# Patient Record
Sex: MALE | Race: WHITE | URBAN - METROPOLITAN AREA
[De-identification: names, ages, dates, MRNs, and addresses within clinical notes are randomized per-mention and may not be internally consistent; named-entity substitution may affect disease eponyms.]

---

## 2024-02-06 ENCOUNTER — OFFICE VISIT (OUTPATIENT)
Age: 69
End: 2024-02-06
Payer: COMMERCIAL

## 2024-02-06 VITALS
HEIGHT: 65 IN | SYSTOLIC BLOOD PRESSURE: 130 MMHG | BODY MASS INDEX: 30.22 KG/M2 | OXYGEN SATURATION: 97 % | DIASTOLIC BLOOD PRESSURE: 72 MMHG | WEIGHT: 181.4 LBS | HEART RATE: 73 BPM | TEMPERATURE: 97.3 F | RESPIRATION RATE: 16 BRPM

## 2024-02-06 DIAGNOSIS — R74.8 ELEVATED LIVER ENZYMES: Primary | ICD-10-CM

## 2024-02-06 DIAGNOSIS — Z86.010 HISTORY OF COLON POLYPS: ICD-10-CM

## 2024-02-06 DIAGNOSIS — E78.2 MIXED HYPERLIPIDEMIA: ICD-10-CM

## 2024-02-06 DIAGNOSIS — D64.9 ANEMIA, UNSPECIFIED TYPE: ICD-10-CM

## 2024-02-06 DIAGNOSIS — I49.9 IRREGULAR HEART BEAT: Primary | ICD-10-CM

## 2024-02-06 DIAGNOSIS — F31.31 BIPOLAR 1 DISORDER, DEPRESSED, MILD (HCC): ICD-10-CM

## 2024-02-06 DIAGNOSIS — I77.89 ASCENDING AORTA ENLARGEMENT (HCC): ICD-10-CM

## 2024-02-06 DIAGNOSIS — I48.19 PERSISTENT ATRIAL FIBRILLATION (HCC): ICD-10-CM

## 2024-02-06 DIAGNOSIS — K76.0 FATTY INFILTRATION OF LIVER: ICD-10-CM

## 2024-02-06 DIAGNOSIS — I77.89 ENLARGED THORACIC AORTA (HCC): ICD-10-CM

## 2024-02-06 PROBLEM — Z86.0100 HISTORY OF COLON POLYPS: Status: ACTIVE | Noted: 2024-02-06

## 2024-02-06 PROCEDURE — 93000 ELECTROCARDIOGRAM COMPLETE: CPT | Performed by: FAMILY MEDICINE

## 2024-02-06 PROCEDURE — 99214 OFFICE O/P EST MOD 30 MIN: CPT | Performed by: FAMILY MEDICINE

## 2024-02-06 PROCEDURE — 1123F ACP DISCUSS/DSCN MKR DOCD: CPT | Performed by: FAMILY MEDICINE

## 2024-02-06 SDOH — ECONOMIC STABILITY: HOUSING INSECURITY
IN THE LAST 12 MONTHS, WAS THERE A TIME WHEN YOU DID NOT HAVE A STEADY PLACE TO SLEEP OR SLEPT IN A SHELTER (INCLUDING NOW)?: NO

## 2024-02-06 SDOH — ECONOMIC STABILITY: FOOD INSECURITY: WITHIN THE PAST 12 MONTHS, THE FOOD YOU BOUGHT JUST DIDN'T LAST AND YOU DIDN'T HAVE MONEY TO GET MORE.: NEVER TRUE

## 2024-02-06 SDOH — ECONOMIC STABILITY: INCOME INSECURITY: HOW HARD IS IT FOR YOU TO PAY FOR THE VERY BASICS LIKE FOOD, HOUSING, MEDICAL CARE, AND HEATING?: NOT HARD AT ALL

## 2024-02-06 SDOH — ECONOMIC STABILITY: FOOD INSECURITY: WITHIN THE PAST 12 MONTHS, YOU WORRIED THAT YOUR FOOD WOULD RUN OUT BEFORE YOU GOT MONEY TO BUY MORE.: NEVER TRUE

## 2024-02-06 ASSESSMENT — PATIENT HEALTH QUESTIONNAIRE - PHQ9
SUM OF ALL RESPONSES TO PHQ QUESTIONS 1-9: 0
SUM OF ALL RESPONSES TO PHQ9 QUESTIONS 1 & 2: 0
1. LITTLE INTEREST OR PLEASURE IN DOING THINGS: 0
2. FEELING DOWN, DEPRESSED OR HOPELESS: 0
4. FEELING TIRED OR HAVING LITTLE ENERGY: 0
1. LITTLE INTEREST OR PLEASURE IN DOING THINGS: 0
SUM OF ALL RESPONSES TO PHQ9 QUESTIONS 1 & 2: 0
3. TROUBLE FALLING OR STAYING ASLEEP: 0
SUM OF ALL RESPONSES TO PHQ QUESTIONS 1-9: 0
2. FEELING DOWN, DEPRESSED OR HOPELESS: 0
7. TROUBLE CONCENTRATING ON THINGS, SUCH AS READING THE NEWSPAPER OR WATCHING TELEVISION: 0
8. MOVING OR SPEAKING SO SLOWLY THAT OTHER PEOPLE COULD HAVE NOTICED. OR THE OPPOSITE, BEING SO FIGETY OR RESTLESS THAT YOU HAVE BEEN MOVING AROUND A LOT MORE THAN USUAL: 0
10. IF YOU CHECKED OFF ANY PROBLEMS, HOW DIFFICULT HAVE THESE PROBLEMS MADE IT FOR YOU TO DO YOUR WORK, TAKE CARE OF THINGS AT HOME, OR GET ALONG WITH OTHER PEOPLE: 0
SUM OF ALL RESPONSES TO PHQ QUESTIONS 1-9: 0
9. THOUGHTS THAT YOU WOULD BE BETTER OFF DEAD, OR OF HURTING YOURSELF: 0
SUM OF ALL RESPONSES TO PHQ QUESTIONS 1-9: 0
5. POOR APPETITE OR OVEREATING: 0

## 2024-02-06 NOTE — PROGRESS NOTES
[facility]     Date of Visit:  2024  Patient Name: Thomas E Schoen   Patient :  1955     CHIEF COMPLAINT/HPI:     Thomas E Schoen is a 68 y.o. male who presents today for an general visit to be evaluated for the following condition(s):  Chief Complaint   Patient presents with    Annual Exam     Patient is here for yearly checkup, no labs      Patient is doing well no new healthcare issues.  He has no complaints.  He has not had any labs in a while that be rectified.  Approximately 3 years ago patient had CT of the chest for coronary artery calcification and was noted to have a 4 cm ascending aorta.  This will need to be repeated.  Patient is asymptomatic and feels great.  He works out every day  REVIEW OF SYSTEM      Review of Systems  Patient feels great has no symptoms just returned from a trip to [place].  No fever no sweats no chills no chest pain no shortness of breath no palpitations no edema no nausea no vomiting no diarrhea    REVIEWED INFORMATION      No Known Allergies    Current Outpatient Medications   Medication Sig Dispense Refill    apixaban (ELIQUIS) 5 MG TABS tablet Take 1 tablet by mouth 2 times daily 60 tablet 11     No current facility-administered medications for this visit.        Patient Active Problem List   Diagnosis    Mixed hyperlipidemia    Elevated liver enzymes    History of colon polyps    Fatty infiltration of liver    Bipolar 1 disorder, depressed, mild (HCC)    Enlarged thoracic aorta (HCC)    Anemia    Ascending aorta enlargement (HCC)    Persistent atrial fibrillation (HCC)    Irregular heart beat       Past Medical History:   Diagnosis Date    Bipolar disorder (HCC)     Colon polyps     Fatty liver     Hyperlipidemia        Past Surgical History:   Procedure Laterality Date    TONSILLECTOMY          Social History     Socioeconomic History    Marital status:      Spouse name: None    Number of children: None    Years of education: None    Highest

## 2024-02-06 NOTE — PROGRESS NOTES
EKG was done on patient for irregular heart rhythm, approved by Dr. Diego Morin. ABN was signed and copy was given to pt

## 2024-02-08 ENCOUNTER — HOSPITAL ENCOUNTER (OUTPATIENT)
Age: 69
Setting detail: SPECIMEN
Discharge: HOME | End: 2024-02-08

## 2024-02-08 LAB
ALBUMIN SERPL-MCNC: 4.2 G/DL (ref 3.5–5.2)
ALBUMIN/GLOB SERPL: 1.7 {RATIO} (ref 1–2.5)
ALP SERPL-CCNC: 46 U/L (ref 40–129)
ALT SERPL-CCNC: 19 U/L (ref 5–41)
ANION GAP SERPL CALCULATED.3IONS-SCNC: 10 MMOL/L (ref 9–17)
AST SERPL-CCNC: 22 U/L
BILIRUB SERPL-MCNC: 0.6 MG/DL (ref 0.3–1.2)
BUN SERPL-MCNC: 16 MG/DL (ref 8–23)
CALCIUM SERPL-MCNC: 9.2 MG/DL (ref 8.6–10.4)
CHLORIDE SERPL-SCNC: 102 MMOL/L (ref 98–107)
CHOLEST SERPL-MCNC: 181 MG/DL
CHOLESTEROL/HDL RATIO: 2.4
CO2 SERPL-SCNC: 23 MMOL/L (ref 20–31)
CREAT SERPL-MCNC: 0.8 MG/DL (ref 0.7–1.2)
ERYTHROCYTE [DISTWIDTH] IN BLOOD BY AUTOMATED COUNT: 14.4 % (ref 11.8–14.4)
GFR SERPL CREATININE-BSD FRML MDRD: >60 ML/MIN/1.73M2
GLUCOSE SERPL-MCNC: 72 MG/DL (ref 70–99)
HCT VFR BLD AUTO: 43.1 % (ref 40.7–50.3)
HDLC SERPL-MCNC: 76 MG/DL
HGB BLD-MCNC: 13.9 G/DL (ref 13–17)
LDLC SERPL CALC-MCNC: 90 MG/DL (ref 0–130)
MCH RBC QN AUTO: 32 PG (ref 25.2–33.5)
MCHC RBC AUTO-ENTMCNC: 32.3 G/DL (ref 28.4–34.8)
MCV RBC AUTO: 99.3 FL (ref 82.6–102.9)
NRBC BLD-RTO: 0 PER 100 WBC
PLATELET # BLD AUTO: 265 K/UL (ref 138–453)
PMV BLD AUTO: 11.5 FL (ref 8.1–13.5)
POTASSIUM SERPL-SCNC: 4 MMOL/L (ref 3.7–5.3)
PROT SERPL-MCNC: 6.7 G/DL (ref 6.4–8.3)
RBC # BLD AUTO: 4.34 M/UL (ref 4.21–5.77)
SODIUM SERPL-SCNC: 135 MMOL/L (ref 135–144)
T4 FREE SERPL-MCNC: 1.2 NG/DL (ref 0.9–1.7)
TRIGL SERPL-MCNC: 76 MG/DL
TSH SERPL DL<=0.05 MIU/L-ACNC: 4.03 UIU/ML (ref 0.3–5)
WBC OTHER # BLD: 6.6 K/UL (ref 3.5–11.3)

## 2024-02-09 ENCOUNTER — TELEPHONE (OUTPATIENT)
Age: 69
End: 2024-02-09

## 2024-02-09 NOTE — TELEPHONE ENCOUNTER
Nattokinase is not effective in atrial fib-- Eliquis is the go to drug.  Okay to take 5 htp --this is an amino acid it is safe and has been shown to improve mood and help depression

## 2024-02-09 NOTE — TELEPHONE ENCOUNTER
Patient is calling asking if he can take Nattokinase instead of Eliquis. Also he is asking  for 5-HTP for depression

## 2024-02-16 ENCOUNTER — HOSPITAL ENCOUNTER (OUTPATIENT)
Dept: CT IMAGING | Age: 69
End: 2024-02-16
Attending: FAMILY MEDICINE
Payer: COMMERCIAL

## 2024-02-16 DIAGNOSIS — I77.89 ASCENDING AORTA ENLARGEMENT (HCC): ICD-10-CM

## 2024-02-16 PROCEDURE — 71250 CT THORAX DX C-: CPT

## 2024-02-17 ENCOUNTER — TELEPHONE (OUTPATIENT)
Age: 69
End: 2024-02-17

## 2024-02-17 NOTE — TELEPHONE ENCOUNTER
Patient's prior authorization has been approved for CT of the chest.  Please call patient-- is it scheduled?

## 2024-02-21 NOTE — RESULT ENCOUNTER NOTE
Please call patient shows 4.4 cm enlargement of the thoracic aorta.  Somewhere along the line I would like him to see thoracic vascular surgeon just for an opinion usually surgery is not done for this size aorta.  Some coronary artery calcium deposits were noted -we will get the opinion of cardiologist that he is seeing for atrial fib.  5 mm size lung nodule is not suspicious referral in chart to see cardiovascular surgeon

## 2024-02-23 ENCOUNTER — TELEPHONE (OUTPATIENT)
Age: 69
End: 2024-02-23

## 2024-02-23 NOTE — TELEPHONE ENCOUNTER
Patient had colonoscopy done March 2021 and he does not need another colon screening until March 2031.  Will his insurance company except that

## 2024-03-22 LAB — NONINV COLON CA DNA+OCC BLD SCRN STL QL: NEGATIVE

## 2024-05-01 ENCOUNTER — OFFICE VISIT (OUTPATIENT)
Age: 69
End: 2024-05-01
Payer: COMMERCIAL

## 2024-05-01 ENCOUNTER — TELEPHONE (OUTPATIENT)
Age: 69
End: 2024-05-01

## 2024-05-01 VITALS
WEIGHT: 193.4 LBS | SYSTOLIC BLOOD PRESSURE: 124 MMHG | BODY MASS INDEX: 32.22 KG/M2 | HEART RATE: 64 BPM | OXYGEN SATURATION: 96 % | DIASTOLIC BLOOD PRESSURE: 82 MMHG | HEIGHT: 65 IN

## 2024-05-01 DIAGNOSIS — G89.29 CHRONIC RIGHT SHOULDER PAIN: ICD-10-CM

## 2024-05-01 DIAGNOSIS — I48.19 PERSISTENT ATRIAL FIBRILLATION (HCC): ICD-10-CM

## 2024-05-01 DIAGNOSIS — E78.2 MIXED HYPERLIPIDEMIA: ICD-10-CM

## 2024-05-01 DIAGNOSIS — M25.511 CHRONIC RIGHT SHOULDER PAIN: ICD-10-CM

## 2024-05-01 DIAGNOSIS — I77.89 ASCENDING AORTA ENLARGEMENT (HCC): Primary | ICD-10-CM

## 2024-05-01 PROCEDURE — 1123F ACP DISCUSS/DSCN MKR DOCD: CPT | Performed by: FAMILY MEDICINE

## 2024-05-01 PROCEDURE — 99213 OFFICE O/P EST LOW 20 MIN: CPT | Performed by: FAMILY MEDICINE

## 2024-05-01 RX ORDER — ATORVASTATIN CALCIUM 10 MG/1
10 TABLET, FILM COATED ORAL DAILY
Qty: 30 TABLET | Refills: 5 | Status: SHIPPED | OUTPATIENT
Start: 2024-05-01

## 2024-05-01 NOTE — PROGRESS NOTES
Socioeconomic History    Marital status:      Spouse name: None    Number of children: None    Years of education: None    Highest education level: None   Tobacco Use    Smoking status: Never    Smokeless tobacco: Never   Vaping Use    Vaping Use: Never used   Substance and Sexual Activity    Alcohol use: Yes     Comment: socially     Social Determinants of Health     Financial Resource Strain: Low Risk  (2/6/2024)    Overall Financial Resource Strain (CARDIA)     Difficulty of Paying Living Expenses: Not hard at all   Food Insecurity: No Food Insecurity (2/6/2024)    Hunger Vital Sign     Worried About Running Out of Food in the Last Year: Never true     Ran Out of Food in the Last Year: Never true   Transportation Needs: Unknown (2/6/2024)    PRAPARE - Transportation     Lack of Transportation (Non-Medical): No   Housing Stability: Unknown (2/6/2024)    Housing Stability Vital Sign     Unstable Housing in the Last Year: No        PHYSICAL EXAM     /82   Pulse 64   Ht 1.651 m (5' 5\")   Wt 87.7 kg (193 lb 6.4 oz)   SpO2 96%   BMI 32.18 kg/m²    Physical Exam    Pulse and blood pressure 1 well-controlled.  Patient has no complaints except for stiffness of the right shoulder which she dislocated years ago.  Neck no masses chest clear heart irregular rhythm of atrial fibs with well-controlled rate.  No murmur was detected.  Abdomen soft flat nontender no pulsatile lesions.  No peripheral edema on today's exam.  ASSESSMENT/PLAN     1. Ascending aorta enlargement (HCC)  2. Persistent atrial fibrillation (HCC)  3. Mixed hyperlipidemia     Patient will be referred to cardiology also to orthopedic surgeons.  Return here in 4 months.  Also will get ultrasound of the abdominal aorta.  Add statin    COMMUNICATION:       Electronically signed by Diego Morin MD on 5/1/2024 at 10:28 AM    Portion of this note were dictated using Dragon speech recognition software. It has been reviewed for accuracy, but may

## 2024-05-01 NOTE — TELEPHONE ENCOUNTER
Please call patient I called in a prescription for statin drug which helps prevent hardening of the arteries I forgot to tell him this at last visit prescription sent into his pharmacy

## 2024-05-01 NOTE — TELEPHONE ENCOUNTER
Patient states that he thinks that he has a nature medication that will help this and he will call back with the name of this medication.

## 2024-05-14 ENCOUNTER — OFFICE VISIT (OUTPATIENT)
Age: 69
End: 2024-05-14
Payer: COMMERCIAL

## 2024-05-14 VITALS — HEIGHT: 65 IN | BODY MASS INDEX: 32.15 KG/M2 | WEIGHT: 193 LBS

## 2024-05-14 DIAGNOSIS — M19.011 PRIMARY OSTEOARTHRITIS OF RIGHT SHOULDER: ICD-10-CM

## 2024-05-14 DIAGNOSIS — M25.511 RIGHT SHOULDER PAIN, UNSPECIFIED CHRONICITY: Primary | ICD-10-CM

## 2024-05-14 DIAGNOSIS — M67.929 BICEPS TENDINOPATHY, UNSPECIFIED LATERALITY: ICD-10-CM

## 2024-05-14 PROCEDURE — 1123F ACP DISCUSS/DSCN MKR DOCD: CPT | Performed by: ORTHOPAEDIC SURGERY

## 2024-05-15 NOTE — PROGRESS NOTES
[facility]      [facility]YS[facility], [facility]  [facility]  [address]  [place] [address]  Dept: [phone]  Dept Fax: [phone]    Chief Compliant:  Chief Complaint   Patient presents with    Shoulder Pain     Right shoulder        History of Present Illness:  This is a pleasant 68 y.o. male who presents with right shoulder pain that is chronic getting progressively worse over many months and worse with overhead activity and lifting.  He does continue to be quite active and is learned to manage his pain.    Previous Treatment:    NSAIDs: Ibuprofen  Physical Therapy: No  Injections: No  Surgery: No    Physical Exam: The right shoulder has 150 degrees of active forward elevation with pain and crepitus.  He has 5/5 supraspinatus and infraspinatus strength.  He is a good lift off and belly press test.  He can internally rotate to his lumbar spine.  He has tenderness to the biceps tendon.    Imaging: X-rays of the right shoulder 3 views taken today show end-stage bone-on-bone glenohumeral joint space narrowing with subchondral cyst subchondral sclerosis and osteophyte formation.  There is no high riding humerus.      Assessment and Plan:    This is a pleasant 68 y.o. male who has right shoulder glenohumeral osteoarthritis and biceps tendinopathy..  His rotator cuff is functioning well.  We discussed options.  Explained that he needs a shoulder replacement surgery.  He would like to delay this.  We elected to proceed with cortisone injection.  Verbal consent was obtained. After the skin was cleansed with alcohol I injected 80 mg of Depo-Medrol and local anesthetic into the right shoulder glenohumeral joint.  Cortisone injection risks include infection, hyperglycemia, post injection pain. The patient tolerated the procedure well with no immediate adverse reactions.    In the future if he would like to proceed

## 2024-05-21 ENCOUNTER — TELEPHONE (OUTPATIENT)
Age: 69
End: 2024-05-21

## 2024-05-21 NOTE — TELEPHONE ENCOUNTER
Patient has a concern about taking a Statin, He is currently taking supplements, betasitosterol and aged garlic, and pycnogenol.  Not aware he needed to start on a statin, did not think his cholesterol numbers are bad, do you really think he needs to take a statin

## 2024-05-21 NOTE — TELEPHONE ENCOUNTER
Statin drugs help prevent hardening of the arteries by an anti-inflammatory effect on the wall of the artery.  This will help slow down enlargement of the aorta

## 2024-05-22 ENCOUNTER — TELEPHONE (OUTPATIENT)
Age: 69
End: 2024-05-22

## 2024-05-22 NOTE — TELEPHONE ENCOUNTER
Patient was given the message. He is having an US test done tomorrow and he sees dr. Iraheta on Friday.

## 2024-05-23 ENCOUNTER — HOSPITAL ENCOUNTER (OUTPATIENT)
Dept: VASCULAR LAB | Age: 69
Discharge: HOME | End: 2024-05-25
Payer: COMMERCIAL

## 2024-05-23 DIAGNOSIS — I77.89 ASCENDING AORTA ENLARGEMENT (HCC): ICD-10-CM

## 2024-05-23 PROCEDURE — 93976 VASCULAR STUDY: CPT

## 2024-05-24 ENCOUNTER — OFFICE VISIT (OUTPATIENT)
Age: 69
End: 2024-05-24
Payer: COMMERCIAL

## 2024-05-24 VITALS
OXYGEN SATURATION: 96 % | BODY MASS INDEX: 32.78 KG/M2 | DIASTOLIC BLOOD PRESSURE: 79 MMHG | HEART RATE: 96 BPM | WEIGHT: 197 LBS | SYSTOLIC BLOOD PRESSURE: 139 MMHG

## 2024-05-24 DIAGNOSIS — I48.19 PERSISTENT ATRIAL FIBRILLATION (HCC): Primary | ICD-10-CM

## 2024-05-24 LAB
VAS AORTA DIST AP: 2.31 CM
VAS AORTA DIST PSV: 87.5 CM/S
VAS AORTA DIST TR: 2.24 CM
VAS AORTA MID AP: 1.96 CM
VAS AORTA MID PSV: 107.3 CM/S
VAS AORTA MID TRANS: 1.76 CM
VAS AORTA PROX AP: 2.6 CM
VAS AORTA PROX PSV: 111.7 CM/S
VAS AORTA PROX TR: 1.85 CM
VAS LEFT COM ILIAC AP: 1.35 CM
VAS LEFT COM ILIAC TRANS: 1.67 CM
VAS RIGHT COM ILIAC AP: 1.55 CM
VAS RIGHT COM ILIAC TRANS: 1.57 CM

## 2024-05-24 PROCEDURE — 93000 ELECTROCARDIOGRAM COMPLETE: CPT | Performed by: INTERNAL MEDICINE

## 2024-05-24 PROCEDURE — 1123F ACP DISCUSS/DSCN MKR DOCD: CPT | Performed by: INTERNAL MEDICINE

## 2024-05-24 PROCEDURE — 99204 OFFICE O/P NEW MOD 45 MIN: CPT | Performed by: INTERNAL MEDICINE

## 2024-05-24 RX ORDER — METOPROLOL SUCCINATE 25 MG/1
25 TABLET, EXTENDED RELEASE ORAL DAILY
Qty: 30 TABLET | Refills: 3 | Status: SHIPPED | OUTPATIENT
Start: 2024-05-24

## 2024-05-24 NOTE — PROGRESS NOTES
Cardiology Office Consultation           CC: Patient is here to establish cardiac care for atrial fibrillation and Asc aortic aneurysm.     HPI  Thomas E Schoen is here to establish cardiac care.  He was recently found to have atrial fibrillation which is persistent.  His heart rate is well-controlled.  Also found to have ascending aortic aneurysm measuring 4.4 cm and was evaluated by cardiothoracic surgery at [facility].  Recommended repeat CTA in 1 year.  As far as symptoms are concerned he denies any recent chest pain or dyspnea.  His functional capacity is good.  He routinely exercises 3-4 times along with high intensity therapies without any decline in his capacity.      Past Medical:  Past Medical History:   Diagnosis Date    Bipolar disorder (HCC)     Colon polyps     Fatty liver     Hyperlipidemia        Past Surgical:  Past Surgical History:   Procedure Laterality Date    TONSILLECTOMY         Family History:  Family History   Problem Relation Age of Onset    Cancer Mother     Cancer Father        Social History:  Social History     Tobacco Use    Smoking status: Never    Smokeless tobacco: Never   Vaping Use    Vaping Use: Never used   Substance Use Topics    Alcohol use: Yes     Comment: socially        REVIEW OF SYSTEMS:    Constitutional: there has been no unanticipated weight loss. There's been No change in energy level, No change in activity level.     Eyes: No visual changes or diplopia. No scleral icterus.  ENT: No Headaches, hearing loss or vertigo. No mouth sores or sore throat.  Cardiovascular: As described in HPI.   Respiratory: AS HPI  Gastrointestinal: No abdominal pain, appetite loss, blood in stools. No change in bowel or bladder habits.  Genitourinary: No dysuria, trouble voiding, or hematuria.  Musculoskeletal:  No gait disturbance, No weakness or joint complaints.  Integumentary: No rash or pruritis.  Neurological: No headache, diplopia, change in muscle strength, numbness or

## 2024-05-28 RX ORDER — METOPROLOL SUCCINATE 25 MG/1
25 TABLET, EXTENDED RELEASE ORAL DAILY
Qty: 90 TABLET | Refills: 3 | OUTPATIENT
Start: 2024-05-28

## 2024-09-02 SDOH — HEALTH STABILITY: PHYSICAL HEALTH: ON AVERAGE, HOW MANY DAYS PER WEEK DO YOU ENGAGE IN MODERATE TO STRENUOUS EXERCISE (LIKE A BRISK WALK)?: 5 DAYS

## 2024-09-02 SDOH — HEALTH STABILITY: PHYSICAL HEALTH: ON AVERAGE, HOW MANY MINUTES DO YOU ENGAGE IN EXERCISE AT THIS LEVEL?: 50 MIN

## 2024-09-02 ASSESSMENT — PATIENT HEALTH QUESTIONNAIRE - PHQ9
SUM OF ALL RESPONSES TO PHQ QUESTIONS 1-9: 2
1. LITTLE INTEREST OR PLEASURE IN DOING THINGS: SEVERAL DAYS
2. FEELING DOWN, DEPRESSED OR HOPELESS: SEVERAL DAYS
SUM OF ALL RESPONSES TO PHQ QUESTIONS 1-9: 2
SUM OF ALL RESPONSES TO PHQ9 QUESTIONS 1 & 2: 2

## 2024-09-02 ASSESSMENT — LIFESTYLE VARIABLES
HOW MANY STANDARD DRINKS CONTAINING ALCOHOL DO YOU HAVE ON A TYPICAL DAY: PATIENT DOES NOT DRINK
HOW OFTEN DO YOU HAVE A DRINK CONTAINING ALCOHOL: NEVER
HOW OFTEN DO YOU HAVE SIX OR MORE DRINKS ON ONE OCCASION: 1
HOW MANY STANDARD DRINKS CONTAINING ALCOHOL DO YOU HAVE ON A TYPICAL DAY: 0
HOW OFTEN DO YOU HAVE A DRINK CONTAINING ALCOHOL: 1

## 2024-09-03 ENCOUNTER — OFFICE VISIT (OUTPATIENT)
Age: 69
End: 2024-09-03
Payer: COMMERCIAL

## 2024-09-03 VITALS
HEIGHT: 65 IN | WEIGHT: 182 LBS | SYSTOLIC BLOOD PRESSURE: 122 MMHG | BODY MASS INDEX: 30.32 KG/M2 | OXYGEN SATURATION: 95 % | HEART RATE: 85 BPM | DIASTOLIC BLOOD PRESSURE: 74 MMHG

## 2024-09-03 DIAGNOSIS — E78.2 MIXED HYPERLIPIDEMIA: ICD-10-CM

## 2024-09-03 DIAGNOSIS — I77.89 ASCENDING AORTA ENLARGEMENT (HCC): ICD-10-CM

## 2024-09-03 DIAGNOSIS — Z00.00 INITIAL MEDICARE ANNUAL WELLNESS VISIT: Primary | ICD-10-CM

## 2024-09-03 DIAGNOSIS — Z12.5 PROSTATE CANCER SCREENING: ICD-10-CM

## 2024-09-03 DIAGNOSIS — I48.19 PERSISTENT ATRIAL FIBRILLATION (HCC): ICD-10-CM

## 2024-09-03 PROCEDURE — G0438 PPPS, INITIAL VISIT: HCPCS | Performed by: FAMILY MEDICINE

## 2024-09-03 PROCEDURE — 1123F ACP DISCUSS/DSCN MKR DOCD: CPT | Performed by: FAMILY MEDICINE

## 2024-09-03 ASSESSMENT — PATIENT HEALTH QUESTIONNAIRE - PHQ9
3. TROUBLE FALLING OR STAYING ASLEEP: NOT AT ALL
7. TROUBLE CONCENTRATING ON THINGS, SUCH AS READING THE NEWSPAPER OR WATCHING TELEVISION: NOT AT ALL
6. FEELING BAD ABOUT YOURSELF - OR THAT YOU ARE A FAILURE OR HAVE LET YOURSELF OR YOUR FAMILY DOWN: SEVERAL DAYS
SUM OF ALL RESPONSES TO PHQ QUESTIONS 1-9: 1
8. MOVING OR SPEAKING SO SLOWLY THAT OTHER PEOPLE COULD HAVE NOTICED. OR THE OPPOSITE, BEING SO FIGETY OR RESTLESS THAT YOU HAVE BEEN MOVING AROUND A LOT MORE THAN USUAL: NOT AT ALL
10. IF YOU CHECKED OFF ANY PROBLEMS, HOW DIFFICULT HAVE THESE PROBLEMS MADE IT FOR YOU TO DO YOUR WORK, TAKE CARE OF THINGS AT HOME, OR GET ALONG WITH OTHER PEOPLE: SOMEWHAT DIFFICULT
9. THOUGHTS THAT YOU WOULD BE BETTER OFF DEAD, OR OF HURTING YOURSELF: NOT AT ALL
SUM OF ALL RESPONSES TO PHQ QUESTIONS 1-9: 1
5. POOR APPETITE OR OVEREATING: NOT AT ALL
4. FEELING TIRED OR HAVING LITTLE ENERGY: NOT AT ALL
SUM OF ALL RESPONSES TO PHQ QUESTIONS 1-9: 1
SUM OF ALL RESPONSES TO PHQ QUESTIONS 1-9: 1

## 2024-09-03 NOTE — PROGRESS NOTES
hyperlipidemia  -     CBC; Future  -     Lipid Panel; Future  -     Comprehensive Metabolic Panel with Bilirubin; Future  -     PSA Screening; Future  5. Prostate cancer screening  -     PSA Screening; Future       Return here in 6 months with appropriate lab reports from cardiology were reviewed    Return in 6 months (on 3/3/2025).    COMMUNICATION:       Electronically signed by Diego Morin MD on 9/3/2024 at 10:31 AM    Portion of this note were dictated using Dragon speech recognition software. It has been reviewed for accuracy, but may contain grammatical and clerical errors.  Medicare Annual Wellness Visit    Thomas E Schoen is here for Medicare AWV and 4 month follow up    Assessment & Plan   Initial Medicare annual wellness visit  Ascending aorta enlargement (HCC)  Persistent atrial fibrillation (HCC)  -     CBC; Future  -     Lipid Panel; Future  -     Comprehensive Metabolic Panel with Bilirubin; Future  -     PSA Screening; Future  Mixed hyperlipidemia  -     CBC; Future  -     Lipid Panel; Future  -     Comprehensive Metabolic Panel with Bilirubin; Future  -     PSA Screening; Future  Prostate cancer screening  -     PSA Screening; Future    Recommendations for Preventive Services Due: see orders and patient instructions/AVS.  Recommended screening schedule for the next 5-10 years is provided to the patient in written form: see Patient Instructions/AVS.     Return in 6 months (on 3/3/2025).     Subjective       Patient's complete Health Risk Assessment and screening values have been reviewed and are found in Flowsheets. The following problems were reviewed today and where indicated follow up appointments were made and/or referrals ordered.    Positive Risk Factor Screenings with Interventions:                  Abnormal BMI (obese):  Body mass index is 30.29 kg/m². (!) Abnormal  Interventions:  Patient continues to exercise and watch his diet on a regular base        Dentist Screen:  Have you seen the

## 2024-09-16 DIAGNOSIS — I48.19 PERSISTENT ATRIAL FIBRILLATION (HCC): ICD-10-CM

## 2024-09-16 DIAGNOSIS — I49.9 IRREGULAR HEART BEAT: ICD-10-CM

## 2024-09-16 DIAGNOSIS — E78.2 MIXED HYPERLIPIDEMIA: ICD-10-CM

## 2024-09-16 DIAGNOSIS — I77.89 ASCENDING AORTA ENLARGEMENT (HCC): ICD-10-CM

## 2024-09-16 RX ORDER — METOPROLOL SUCCINATE 25 MG/1
25 TABLET, EXTENDED RELEASE ORAL DAILY
Qty: 90 TABLET | Refills: 3 | Status: SHIPPED | OUTPATIENT
Start: 2024-09-16

## 2024-11-21 ENCOUNTER — TELEPHONE (OUTPATIENT)
Age: 69
End: 2024-11-21

## 2024-11-21 NOTE — TELEPHONE ENCOUNTER
L/M For patient to C/B to give Eloquis instructions. Form faxed to Dentist's office and scanned in chart

## 2024-11-21 NOTE — TELEPHONE ENCOUNTER
Patient is having tooth pulled 11/25, needs medical clearance form filled out to stop Eloquis.   Did not take his meds today 11/21, should he stop now?  Form also needs faxed back to Dentist   Please call patient at [phone]  to let him know. Form put in Ronny rack

## 2025-01-24 ENCOUNTER — TELEPHONE (OUTPATIENT)
Age: 70
End: 2025-01-24

## 2025-01-24 DIAGNOSIS — I77.89 ASCENDING AORTA ENLARGEMENT (HCC): Primary | ICD-10-CM

## 2025-01-24 NOTE — TELEPHONE ENCOUNTER
Patient states that you had referred him to Dr Bailey and he had to have a CT for an enlarged aorta.  [facility] states that he seen the specialist and he was not overly concerned about anything.  Patient is questioning how often he needs to repeat the CT and if he does he wants to stay within [facility].  Patient states that he is having no symptoms and is feeling great.  Please advise

## 2025-02-25 ENCOUNTER — HOSPITAL ENCOUNTER (OUTPATIENT)
Age: 70
Setting detail: SPECIMEN
Discharge: HOME | End: 2025-02-25

## 2025-02-25 LAB
ALBUMIN SERPL-MCNC: 4.3 G/DL (ref 3.5–5.2)
ALBUMIN/GLOB SERPL: 2 {RATIO} (ref 1–2.5)
ALP SERPL-CCNC: 44 U/L (ref 40–129)
ALT SERPL-CCNC: 23 U/L (ref 10–50)
ANION GAP SERPL CALCULATED.3IONS-SCNC: 9 MMOL/L (ref 9–16)
AST SERPL-CCNC: 22 U/L (ref 10–50)
BILIRUB DIRECT SERPL-MCNC: 0.1 MG/DL (ref 0–0.2)
BILIRUB INDIRECT SERPL-MCNC: 0.2 MG/DL (ref 0–1)
BILIRUB SERPL-MCNC: 0.3 MG/DL (ref 0–1.2)
BUN SERPL-MCNC: 14 MG/DL (ref 8–23)
CALCIUM SERPL-MCNC: 9.2 MG/DL (ref 8.6–10.4)
CHLORIDE SERPL-SCNC: 105 MMOL/L (ref 98–107)
CHOLEST SERPL-MCNC: 183 MG/DL (ref 0–199)
CHOLESTEROL/HDL RATIO: 3.7
CO2 SERPL-SCNC: 26 MMOL/L (ref 20–31)
CREAT SERPL-MCNC: 0.8 MG/DL (ref 0.7–1.2)
ERYTHROCYTE [DISTWIDTH] IN BLOOD BY AUTOMATED COUNT: 12.5 % (ref 11.8–14.4)
GFR, ESTIMATED: >90 ML/MIN/1.73M2
GLUCOSE SERPL-MCNC: 98 MG/DL (ref 74–99)
HCT VFR BLD AUTO: 41.9 % (ref 40.7–50.3)
HDLC SERPL-MCNC: 50 MG/DL
HGB BLD-MCNC: 14.4 G/DL (ref 13–17)
LDLC SERPL CALC-MCNC: 119 MG/DL (ref 0–100)
MCH RBC QN AUTO: 33 PG (ref 25.2–33.5)
MCHC RBC AUTO-ENTMCNC: 34.4 G/DL (ref 28.4–34.8)
MCV RBC AUTO: 96.1 FL (ref 82.6–102.9)
NRBC BLD-RTO: 0 PER 100 WBC
PLATELET # BLD AUTO: 220 K/UL (ref 138–453)
PMV BLD AUTO: 11.4 FL (ref 8.1–13.5)
POTASSIUM SERPL-SCNC: 4.3 MMOL/L (ref 3.7–5.3)
PROT SERPL-MCNC: 6.5 G/DL (ref 6.6–8.7)
PSA SERPL-MCNC: 0.9 NG/ML (ref 0–4)
RBC # BLD AUTO: 4.36 M/UL (ref 4.21–5.77)
SODIUM SERPL-SCNC: 140 MMOL/L (ref 136–145)
TRIGL SERPL-MCNC: 71 MG/DL
VLDLC SERPL CALC-MCNC: 14 MG/DL (ref 1–30)
WBC OTHER # BLD: 3.9 K/UL (ref 3.5–11.3)

## 2025-03-01 SDOH — ECONOMIC STABILITY: TRANSPORTATION INSECURITY
IN THE PAST 12 MONTHS, HAS THE LACK OF TRANSPORTATION KEPT YOU FROM MEDICAL APPOINTMENTS OR FROM GETTING MEDICATIONS?: NO

## 2025-03-01 SDOH — ECONOMIC STABILITY: FOOD INSECURITY: WITHIN THE PAST 12 MONTHS, THE FOOD YOU BOUGHT JUST DIDN'T LAST AND YOU DIDN'T HAVE MONEY TO GET MORE.: NEVER TRUE

## 2025-03-01 SDOH — ECONOMIC STABILITY: INCOME INSECURITY: IN THE LAST 12 MONTHS, WAS THERE A TIME WHEN YOU WERE NOT ABLE TO PAY THE MORTGAGE OR RENT ON TIME?: NO

## 2025-03-01 SDOH — ECONOMIC STABILITY: FOOD INSECURITY: WITHIN THE PAST 12 MONTHS, YOU WORRIED THAT YOUR FOOD WOULD RUN OUT BEFORE YOU GOT MONEY TO BUY MORE.: NEVER TRUE

## 2025-03-01 SDOH — ECONOMIC STABILITY: TRANSPORTATION INSECURITY
IN THE PAST 12 MONTHS, HAS LACK OF TRANSPORTATION KEPT YOU FROM MEETINGS, WORK, OR FROM GETTING THINGS NEEDED FOR DAILY LIVING?: NO

## 2025-03-04 ENCOUNTER — OFFICE VISIT (OUTPATIENT)
Age: 70
End: 2025-03-04
Payer: COMMERCIAL

## 2025-03-04 VITALS
HEIGHT: 65 IN | OXYGEN SATURATION: 98 % | SYSTOLIC BLOOD PRESSURE: 122 MMHG | DIASTOLIC BLOOD PRESSURE: 68 MMHG | HEART RATE: 74 BPM | BODY MASS INDEX: 29.66 KG/M2 | WEIGHT: 178 LBS

## 2025-03-04 DIAGNOSIS — E78.2 MIXED HYPERLIPIDEMIA: ICD-10-CM

## 2025-03-04 DIAGNOSIS — I48.19 PERSISTENT ATRIAL FIBRILLATION (HCC): ICD-10-CM

## 2025-03-04 DIAGNOSIS — I77.89 ASCENDING AORTA ENLARGEMENT: Primary | ICD-10-CM

## 2025-03-04 PROCEDURE — 1123F ACP DISCUSS/DSCN MKR DOCD: CPT | Performed by: FAMILY MEDICINE

## 2025-03-04 PROCEDURE — 99214 OFFICE O/P EST MOD 30 MIN: CPT | Performed by: FAMILY MEDICINE

## 2025-03-04 ASSESSMENT — PATIENT HEALTH QUESTIONNAIRE - PHQ9
9. THOUGHTS THAT YOU WOULD BE BETTER OFF DEAD, OR OF HURTING YOURSELF: NOT AT ALL
SUM OF ALL RESPONSES TO PHQ QUESTIONS 1-9: 3
10. IF YOU CHECKED OFF ANY PROBLEMS, HOW DIFFICULT HAVE THESE PROBLEMS MADE IT FOR YOU TO DO YOUR WORK, TAKE CARE OF THINGS AT HOME, OR GET ALONG WITH OTHER PEOPLE: SOMEWHAT DIFFICULT
SUM OF ALL RESPONSES TO PHQ QUESTIONS 1-9: 3
2. FEELING DOWN, DEPRESSED OR HOPELESS: SEVERAL DAYS
SUM OF ALL RESPONSES TO PHQ QUESTIONS 1-9: 3
7. TROUBLE CONCENTRATING ON THINGS, SUCH AS READING THE NEWSPAPER OR WATCHING TELEVISION: NOT AT ALL
5. POOR APPETITE OR OVEREATING: NOT AT ALL
8. MOVING OR SPEAKING SO SLOWLY THAT OTHER PEOPLE COULD HAVE NOTICED. OR THE OPPOSITE, BEING SO FIGETY OR RESTLESS THAT YOU HAVE BEEN MOVING AROUND A LOT MORE THAN USUAL: NOT AT ALL
SUM OF ALL RESPONSES TO PHQ QUESTIONS 1-9: 3
3. TROUBLE FALLING OR STAYING ASLEEP: NOT AT ALL
1. LITTLE INTEREST OR PLEASURE IN DOING THINGS: SEVERAL DAYS
6. FEELING BAD ABOUT YOURSELF - OR THAT YOU ARE A FAILURE OR HAVE LET YOURSELF OR YOUR FAMILY DOWN: SEVERAL DAYS
4. FEELING TIRED OR HAVING LITTLE ENERGY: NOT AT ALL

## 2025-03-04 NOTE — PROGRESS NOTES
[facility]     Date of Visit:  3/4/2025  Patient Name: Thomas E Schoen   Patient :  1955     CHIEF COMPLAINT/HPI:     Thomas E Schoen is a 69 y.o. male who presents today for an general visit to be evaluated for the following condition(s):  Chief Complaint   Patient presents with    4 Month Follow Up    Blood Work     Patient here for follow-up regarding labs which were quite good cholesterol especially LDL drifted up a little bit but not bad.  He continues to workout on a regular basis.  He is also due for follow-up CT of the chest to monitor ascending aorta this will be ordered today.  Reports from cardiology and cardiovascular surgery were reviewed    Lab Results   Component Value Date/Time     2025 08:05 AM    K 4.3 2025 08:05 AM     2025 08:05 AM    CO2 26 2025 08:05 AM    BUN 14 2025 08:05 AM    CREATININE 0.8 2025 08:05 AM    GLUCOSE 98 2025 08:05 AM    CALCIUM 9.2 2025 08:05 AM    LABGLOM >90 2025 08:05 AM    LABGLOM >60 2024 07:09 AM          Lab Results   Component Value Date    CHOL 183 2025    TRIG 71 2025    HDL 50 2025    VLDL 14 2025    CHOLHDLRATIO 3.7 2025        Lab Results   Component Value Date    WBC 3.9 2025    HGB 14.4 2025    HCT 41.9 2025    MCV 96.1 2025     2025       Lab Results   Component Value Date    ALT 23 2025    AST 22 2025    ALKPHOS 44 2025    BILITOT 0.3 2025        Lab Results   Component Value Date    TSH 4.03 2024        REVIEW OF SYSTEM      Review of Systems  Patient feels great he works out on a regular basis no fever no sweats no chills no chest pain no shortness of breath no nausea vomiting no diarrhea rare edema issues he does wear support hose during the day    REVIEWED INFORMATION      No Known Allergies    Current Outpatient Medications   Medication Sig Dispense Refill    apixaban

## 2025-03-12 ENCOUNTER — RESULTS FOLLOW-UP (OUTPATIENT)
Age: 70
End: 2025-03-12

## 2025-03-12 ENCOUNTER — HOSPITAL ENCOUNTER (OUTPATIENT)
Dept: CT IMAGING | Age: 70
Discharge: HOME | End: 2025-03-14
Attending: FAMILY MEDICINE
Payer: COMMERCIAL

## 2025-03-12 ENCOUNTER — HOSPITAL ENCOUNTER (OUTPATIENT)
Age: 70
Setting detail: SPECIMEN
Discharge: HOME | End: 2025-03-12

## 2025-03-12 DIAGNOSIS — E78.2 MIXED HYPERLIPIDEMIA: ICD-10-CM

## 2025-03-12 DIAGNOSIS — I77.89 ASCENDING AORTA ENLARGEMENT: ICD-10-CM

## 2025-03-12 LAB
CHOLEST SERPL-MCNC: 166 MG/DL (ref 0–199)
CHOLESTEROL/HDL RATIO: 3.5
HDLC SERPL-MCNC: 48 MG/DL
LDLC SERPL CALC-MCNC: 105 MG/DL (ref 0–100)
TRIGL SERPL-MCNC: 64 MG/DL
VLDLC SERPL CALC-MCNC: 13 MG/DL (ref 1–30)

## 2025-03-12 PROCEDURE — 71250 CT THORAX DX C-: CPT

## 2025-03-13 ENCOUNTER — RESULTS FOLLOW-UP (OUTPATIENT)
Dept: CT IMAGING | Age: 70
End: 2025-03-13

## 2025-06-29 ENCOUNTER — HOSPITAL ENCOUNTER (EMERGENCY)
Age: 70
Discharge: HOME | End: 2025-06-29
Attending: EMERGENCY MEDICINE
Payer: COMMERCIAL

## 2025-06-29 VITALS
BODY MASS INDEX: 29.02 KG/M2 | OXYGEN SATURATION: 98 % | SYSTOLIC BLOOD PRESSURE: 133 MMHG | RESPIRATION RATE: 18 BRPM | WEIGHT: 174.16 LBS | DIASTOLIC BLOOD PRESSURE: 85 MMHG | TEMPERATURE: 98.6 F | HEART RATE: 71 BPM | HEIGHT: 65 IN

## 2025-06-29 DIAGNOSIS — S61.012A LACERATION OF LEFT THUMB WITHOUT FOREIGN BODY WITHOUT DAMAGE TO NAIL, INITIAL ENCOUNTER: Primary | ICD-10-CM

## 2025-06-29 PROCEDURE — 6360000002 HC RX W HCPCS

## 2025-06-29 PROCEDURE — 90471 IMMUNIZATION ADMIN: CPT

## 2025-06-29 PROCEDURE — 12001 RPR S/N/AX/GEN/TRNK 2.5CM/<: CPT

## 2025-06-29 PROCEDURE — 90715 TDAP VACCINE 7 YRS/> IM: CPT

## 2025-06-29 PROCEDURE — 99284 EMERGENCY DEPT VISIT MOD MDM: CPT

## 2025-06-29 RX ORDER — LIDOCAINE HYDROCHLORIDE 10 MG/ML
5 INJECTION, SOLUTION EPIDURAL; INFILTRATION; INTRACAUDAL; PERINEURAL ONCE
Status: COMPLETED | OUTPATIENT
Start: 2025-06-29 | End: 2025-06-29

## 2025-06-29 RX ADMIN — TETANUS TOXOID, REDUCED DIPHTHERIA TOXOID AND ACELLULAR PERTUSSIS VACCINE, ADSORBED 0.5 ML: 5; 2.5; 8; 8; 2.5 SUSPENSION INTRAMUSCULAR at 18:21

## 2025-06-29 RX ADMIN — LIDOCAINE HYDROCHLORIDE 5 ML: 10 INJECTION, SOLUTION EPIDURAL; INFILTRATION; INTRACAUDAL; PERINEURAL at 18:24

## 2025-06-29 ASSESSMENT — PAIN DESCRIPTION - DESCRIPTORS: DESCRIPTORS: ACHING

## 2025-06-29 ASSESSMENT — PAIN DESCRIPTION - PAIN TYPE: TYPE: ACUTE PAIN

## 2025-06-29 ASSESSMENT — PAIN - FUNCTIONAL ASSESSMENT: PAIN_FUNCTIONAL_ASSESSMENT: 0-10

## 2025-06-29 ASSESSMENT — PAIN DESCRIPTION - LOCATION: LOCATION: HAND

## 2025-06-29 ASSESSMENT — PAIN DESCRIPTION - ORIENTATION: ORIENTATION: LEFT

## 2025-06-29 ASSESSMENT — PAIN SCALES - GENERAL: PAINLEVEL_OUTOF10: 3

## 2025-06-29 NOTE — DISCHARGE INSTRUCTIONS
You were evaluated in the emergency room for your symptoms. Thank you for allowing me to participate in your care today.     4 sutures were used to close the your left thumb laceration.  Do not soak this area.  Wash with soap and water at least once daily and apply triple antibiotic.  Sutures can be removed in 7 to 10 days.  You can use Tylenol as needed for pain.  Elevate and ice the hand for any increased pain, swelling, or throbbing.  Monitor for any signs of infection including increased redness, swelling, drainage, warmth, pain, decreased range of motion to the thumb, numbness or tingling, or fever.  Please follow-up with provider for reevaluation if any concern for infection.    Please follow up with your family doctor in the next few days for reevaluation.  Please return to the emergency room for worsening symptoms or additional concerns as we discussed.

## 2025-06-29 NOTE — ED PROVIDER NOTES
eMERGENCY dEPARTMENT  Attending Physician Attestation     Pt Name: Thomas E Schoen  MRN: [id number]  Birthdate 1955  Date of evaluation: 6/29/25     Thomas E Schoen is a 69 y.o. male with CC: Laceration (2cm laceration to left hand, thenar area.  Proximal thumb area.  Accidental injury today while using sharp cutting tool at home. ROM intact.  Bleeding controlled. )      I was available to render services if needed but did not directly participate in the care of the patient.    Vitals Reviewed:    Vitals:    06/29/25 1701   BP: 133/85   Pulse: 71   Resp: 18   Temp: 98.6 °F (37 °C)   TempSrc: Oral   SpO2: 98%   Weight: 79 kg (174 lb 2.6 oz)   Height: 1.65 m (5' 4.96\")       Initial Pain Score: Pain Level: 3 (06/29/25 1701)  Last Pain Score: Pain Level: 3 (06/29/25 1701)    Ab Garner MD  Attending Emergency Physician                Ab Garner MD  06/29/25 1614

## 2025-06-29 NOTE — ED PROVIDER NOTES
[facility] Emergency Department  [address]  [place] [address]  Phone: [phone]  Fax: [phone]      Patient Name:  Thomas E Schoen  Medical Record Number:  [id number]  YOB: 1955  Date of Service:  6/29/2025  Primary Care Physician:  Diego Morin MD      CHIEF COMPLAINT:       Chief Complaint   Patient presents with    Laceration     2cm laceration to left hand, thenar area.  Proximal thumb area.  Accidental injury today while using sharp cutting tool at home. ROM intact.  Bleeding controlled.        HISTORY OF PRESENT ILLNESS:    Thomas E Schoen is a 69 y.o. male who presents with the complaint of laceration to left thumb.  Past medical history of hyperlipidemia, bipolar, atrial fibrillation on Eliquis and metoprolol.  Patient presents to the emergency room for a laceration to the base of his left thumb.  Patient has a 2 cm linear superficial laceration to the left thumb.  Bleeding controlled upon arrival.  Patient has normal range of motion to the thumb.  No numbness or tingling.  States he cut his thumb on a pair of pruning kendra.  States he does not know when his last tetanus was.  States pain is a 2/10.  Patient is alert and oriented.  Vital signs stable.  Denies chest pain, shortness breath, nausea, vomiting, abdominal pain, urinary symptoms.    CURRENT MEDICATIONS:      Discharge Medication List as of 6/29/2025  6:20 PM        CONTINUE these medications which have NOT CHANGED    Details   apixaban (ELIQUIS) 5 MG TABS tablet Take 1 tablet by mouth 2 times daily, Disp-180 tablet, R-2Normal      metoprolol succinate (TOPROL XL) 25 MG extended release tablet Take 1 tablet by mouth daily, Disp-90 tablet, R-3Normal             ALLERGIES:   has no known allergies.    PAST MEDICAL HISTORY:    has a past medical history of Bipolar disorder (HCC), Colon polyps, Fatty liver, and Hyperlipidemia.    SURGICAL HISTORY:      has a past surgical history that includes

## 2025-07-10 ENCOUNTER — OFFICE VISIT (OUTPATIENT)
Age: 70
End: 2025-07-10
Payer: COMMERCIAL

## 2025-07-10 VITALS — WEIGHT: 173 LBS | HEIGHT: 65 IN | BODY MASS INDEX: 28.82 KG/M2

## 2025-07-10 DIAGNOSIS — S61.012A LACERATION OF LEFT THUMB WITHOUT FOREIGN BODY WITHOUT DAMAGE TO NAIL, INITIAL ENCOUNTER: Primary | ICD-10-CM

## 2025-07-10 PROCEDURE — 99213 OFFICE O/P EST LOW 20 MIN: CPT | Performed by: FAMILY MEDICINE

## 2025-07-10 PROCEDURE — 1123F ACP DISCUSS/DSCN MKR DOCD: CPT | Performed by: FAMILY MEDICINE

## 2025-07-10 NOTE — PROGRESS NOTES
[facility]     Date of Visit:  7/10/2025  Patient Name: Thomas E Schoen   Patient :  1955     CHIEF COMPLAINT/HPI:     Thomas E Schoen is a 69 y.o. male who presents today for an general visit to be evaluated for the following condition(s):  Chief Complaint   Patient presents with    Suture / Staple Removal     LT hand.     Laceration left thenar eminence which was repaired in the ER on .  Patient is here for suture removal  REVIEW OF SYSTEM      Review of Systems    Patient has no other healthcare issues.  No fever no sweats no chills. No chest pain nor shortness of breath. No nausea nor vomiting no diarrhea . No  complaints.  No edema issues.      No Known Allergies    Current Outpatient Medications   Medication Sig Dispense Refill    metoprolol succinate (TOPROL XL) 25 MG extended release tablet Take 1 tablet by mouth daily 90 tablet 3    apixaban (ELIQUIS) 5 MG TABS tablet Take 1 tablet by mouth 2 times daily (Patient not taking: Reported on 2025) 180 tablet 2     No current facility-administered medications for this visit.        Patient Active Problem List   Diagnosis    Mixed hyperlipidemia    Elevated liver enzymes    History of colon polyps    Fatty infiltration of liver    Bipolar 1 disorder, depressed, mild (HCC)    Enlarged thoracic aorta    Anemia    Ascending aorta enlargement    Persistent atrial fibrillation (HCC)    Irregular heart beat       Past Medical History:   Diagnosis Date    Bipolar disorder (HCC)     Colon polyps     Fatty liver     Hyperlipidemia        Past Surgical History:   Procedure Laterality Date    TONSILLECTOMY          Social History     Socioeconomic History    Marital status:      Spouse name: None    Number of children: None    Years of education: None    Highest education level: None   Tobacco Use    Smoking status: Never    Smokeless tobacco: Never   Vaping Use    Vaping status: Never Used   Substance and Sexual Activity    Alcohol use: